# Patient Record
(demographics unavailable — no encounter records)

---

## 2017-01-01 NOTE — PN
Progress Note (short form)





- Note


Progress Note: 


This is 41 wks AGA baby girl born to 29yr mom with schedule repeat c/s, baby 

cried well after birth.


Apgar score 9 and 9.


Mat Hx: Unremarkable


General Appearance: Yes: No Abnormalities


Skin: Yes: No Abnormalities


Head: Yes: No Abnormalities


Eyes: Yes: No Abnormalities


Ears: Yes: No Abnormalities


Nose: Yes: No Abnormalities


Mouth: Yes: No Abnormalities


Chest: Yes: No Abnormalities


Lungs/Respiratory: Yes: No Abnormalities, Clear, Bilateral good air entry


Cardiac: Yes: No Abnormalities (RRR, Nl S1/S2, no murmur)


Abdomen: Yes: No Abnormalities, Umb Ves, 2 artery 1 vein


Gastrointestinal: Yes: No Abnormalities


Genitalia: No Abnormalities


Genitalia, female


Anus: Yes: No Abnormalities, Patent


Extremities: Yes: No Abnormalities


Femoral Pulse: Strong


Ortolani Test: Negative


Thao Test: Negative


Reflexes: Kerrick: Present


Neuro: Yes: No Abnormalities


Cry: Yes: No Abnormalities





Impression: Well Slaughter


Plan


Nutritional support

## 2017-01-01 NOTE — HP
- Maternal History


HBSAG: Negative


Date: 16


RPR: Negative


Date: 16


Group B Strep: Positive


GBS Treated in Labor: No


HIV: Negative





- Maternal Risks


OB Risks:  - failed induction,.  fibroids.  GDM- diet controlled.  

MRSA - treated





[place] Data





- Admission


Date of Admission: 17


Admission Time: 13:50


Date of Delivery: 17


Time of Delivery: 13:37


Wks Gestation by Dates: 40.2


Wks Gestation by Sono: 40.4


Infant Gender: Female


Type of Delivery: Repeat C/S


Reason for C Section: scheduled


Apgar Score @1 Minute: 9


Apgar score @ 5 Minutes: 9


Birth Weight: 3.92 kg


Birth Length: 19.5 in


Head Circumference, Admission: 35.5


Chest Circumference: 35.5


Abdominal Girth: 34





- Vital Signs


  ** Left Upper Arm


Blood Pressure: 72/50


Blood Pressure Mean: 57





  ** Right Upper Arm


Blood Pressure: 78/50


Blood Pressure Mean: 59





  ** Left Calf


Blood Pressure: 72/48


Blood Pressure Mean: 56





  ** Right Calf


Blood Pressure: 84/50


Blood Pressure Mean: 61





- Labs


Labs: 


 Baby's Blood Type, Krystyna











Cord Blood Type  B POSITIVE   17  14:30    


 


BISHNU, Poly Interpret  Negative  (NEGATIVE)   17  14:30    














 Infant, Physical Exam





-  Infant, Admission Exam


Birth Weight: 3.92 kg


Birth Length: 19.5 in


Chest Circumference: 35.5


Initial Vital Signs: 


 Initial Vital Signs











Temp Pulse Resp


 


 98.1 F   142   34 


 


 17 15:03  17 15:03  17 15:03











General Appearance: Yes: No Abnormalities, Pink


Skin: Yes: No Abnormalities


Head: Yes: No Abnormalities, Fontanel flat


Eyes: Yes: No Abnormalities, Clear, Red reflex present (bilaterally)


Ears: Yes: No Abnormalities, Symmetrical.  No: Low set, Periauricular sinus, 

Periauricular skin tag


Nose: Yes: No Abnormalities, Nares patent


Mouth: Yes: No Abnormalities.  No: Cleft lip, Cleft palate


Chest: Yes: No Abnormalities, Symmetrical, Clavicles intact


Lungs/Respiratory: Yes: No Abnormalities, Clear, Bilateral good air entry


Cardiac: Yes: No Abnormalities, S1, S2.  No: Murmur


Abdomen: Yes: No Abnormalities


Gastrointestinal: Yes: No Abnormalities, Active bowel sounds


Genitalia: No Abnormalities


Genitalia, Female: Yes: Labia Normal


Anus: Yes: No Abnormalities, Patent


Extremities: Yes: No Abnormalities, 10 Fingers, 10 Toes


Clavicles: No abnormalities


Femoral Pulse: Strong


Ortolani Test: Negative


Thao Test: Negative


Spine: Yes: No Abnormalities.  No: Sacral tracts, Sacral dimple, Hair tuft


Reflexes: Debby: Present (symmetric), Rooting: Present, Sucking: Present


Neuro: Yes: No Abnormalities, Alert


Cry: Yes: No Abnormalities, Strong





Problem List





- Problems


(1) Single liveborn, born in hospital, delivered by  delivery


Assessment/Plan: 


Ex-39 week LGA (8 lb 10 oz) female born via repeat , APGAR 9, 9, to a 

 mother with a history of GDM, diet controlled, MRSA (), and GBS+, not 

treated, ROM at delivery. MBT Bpos, BBT B pos/Krystyna neg, with remainder of 

maternal labs negative. Exam normal, breastfeeding and formula feeding, doing 

well. Hepatitis B vaccine given. Routine  care, encourage breastfeeding. 


Code(s): Z38.01 - SINGLE LIVEBORN INFANT, DELIVERED BY

## 2017-01-01 NOTE — DS
- Maternal History


Mother's Age: 30YO


 Status: 


HBSAG: Negative


Date: 16


RPR: Negative


Date: 16


Group B Strep: Positive


GBS Treated in Labor: No


HIV: Negative





- Maternal Risks


OB Risks:  - failed induction,.  fibroids.  GDM- diet controlled.  

MRSA - treated





 Data





- Admission


Date of Admission: 17


Admission Time: 13:50


Date of Delivery: 17


Time of Delivery: 13:37


Wks Gestation by Dates: 40.2


Wks Gestation by Sono: 40.4


Infant Gender: Female


Type of Delivery: Repeat C/S


Reason for C Section: scheduled


Apgar Score @1 Minute: 9


Apgar score @ 5 Minutes: 9


Birth Weight: 8 lb 10.274 oz


Birth Length: 19.5 in


Head Circumference, Admission: 35.5


Chest Circumference: 35.5


Abdominal Girth: 34





- Vital Signs


  ** Left Upper Arm


Blood Pressure: 72/50


Blood Pressure Mean: 57





  ** Right Upper Arm


Blood Pressure: 78/50


Blood Pressure Mean: 59





  ** Left Calf


Blood Pressure: 72/48


Blood Pressure Mean: 56





  ** Right Calf


Blood Pressure: 84/50


Blood Pressure Mean: 61





- Hearing Screen


Left Ear: Passed


Right Ear: Passed


Hearing Screen Complete: 04/15/17





- Labs


Labs: 


 Transcutaneous Bilirubin











Transcutaneous Bilirubin       04/15/17





performed                      


 


Transcutaneous Bilirubin       6.2





result                         











 Baby's Blood Type, Krystyna











Cord Blood Type  B POSITIVE   17  14:30    


 


BISHNU, Poly Interpret  Negative  (NEGATIVE)   17  14:30    














- Hepatitis B Vaccine Given


Date: 


Medication





Hepatitis B Vaccine (Engerix-B 10 Mcg/0.5 Ml *Pediatric* -)  10 mcg IM .ONCE ONE


   Stop: 17 18:01











[place] PE, Discharge





- Physical Exam


Last Weight Documented: 8 lb 3.925 oz


Vital Signs: 


 Vital Signs











Temperature  98.2 F   17 07:30


 


Pulse Rate  130   17 09:00


 


Respiratory Rate  34   17 15:03


 


Blood Pressure  72/50   04/15/17 08:19


 


O2 Sat by Pulse Oximetry (%)      








 SpO2





Preductal SpO2, Right Arm        100


Postductal SpO2 [Left Leg]       100








General Appearance: Yes: Well flexed, Full ROM, Spontaneous movements, Pink


Skin: Yes: No Abnormalities, Other (multiple large flat salate grey nevi on back

/buttocks)


Head: Yes: Fontanel flat


Eyes: Yes: Clear


Ears: Yes: Symmetrical.  No: Periauricular sinus, Periauricular skin tag


Nose: Yes: Nares patent


Mouth: Yes: No Abnormalities.  No: Cleft lip, Cleft palate


Chest: Yes: No Abnormalities, Symmetrical, Clavicles intact


Lungs/Respiratory: Yes: Clear, Bilateral good air entry.  No: Sternal 

retractions, Substernal retractions


Cardiac: Yes: S1, S2.  No: Murmur


Abdomen: Yes: Umb Ves, 2 artery 1 vein.  No: Mass palpable


Gastrointestinal: No: Hepatomegaly, Splenomegaly


Genitalia: No Abnormalities


Genitalia, Female: Yes: Labia Normal


Anus: Yes: Patent


Extremities: Yes: 10 Fingers, 10 Toes


Spine: No: Sacral dimple, Hair tuft


Reflexes: Debby: Present (symmetric), Rooting: Present, Sucking: Present


Neuro: Yes: No Abnormalities, Alert, Active


Cry: Yes: Strong


Preductal SpO2, Right Arm: 100


  ** Left Leg


Postductal SpO2: 100





Problem List





- Problems


(1) Single liveborn, born in hospital, delivered by  delivery


Assessment/Plan: 


AGA FEMALE BORN TO 30YO  ,GBS POS MONTHER WITH ROM @ DELIVERY


P: ROUTINE CARE


FEED AD JULISA


Code(s): Z38.01 - SINGLE LIVEBORN INFANT, DELIVERED BY 








Discharge Summary


Reason For Visit: 


Current Active Problems





Single liveborn, born in hospital, delivered by  delivery (Acute) 








Condition: Good





- Instructions


Diet, Activity, Other Instructions: 


Ex-39 week LGA (8 lb 10 oz) female born via repeat , APGAR 9, 9, to a 

 mother with a history of GDM, diet controlled, MRSA (), and GBS+, not 

treated, ROM at delivery. MBT Bpos, BBT B pos/Krystyna neg, with remainder of 

maternal labs negative. Exam normal, breastfeeding and formula feeding, doing 

well. Hepatitis B vaccine given, hearing screen passed bilaterally. Routine 

 care, encourage breastfeeding. Anticipatory guidance reviewed: Safe 

sleeping, never shake baby, umbilical stump care/sponge bathe exclusively for 

now, normal  periodic breathing pattern, normal  stooling pattern

, place baby in sunlight with skin exposed for 15 minutes 2-3 times a day, keep 

away sick contacts and report to ED for any temp of 100.4F or greater. Follow-

up with pediatrician 1-2 days after discharge home. Call  for any questions 

regarding baby. 


Referrals: 


Axel Stiles MD [Staff Physician] - 17


Disposition: HOME

## 2017-01-01 NOTE — PN
[place], Progress Note





- [place] Exam


Weight: 3.71 kg


Chest Circumference: 35.5


Head Circumference: 35.5


Vital Signs: 


 Vital Signs











Temperature  98.4 F   04/15/17 20:25


 


Pulse Rate  142   17 15:03


 


Respiratory Rate  34   17 15:03


 


Blood Pressure  72/50   04/15/17 08:19


 


O2 Sat by Pulse Oximetry (%)      











General Appearance: Yes: No Abnormalities, Pink


Skin: Yes: No Abnormalities, Other (multiple large flat salate grey nevi on back

/buttocks)


Head: Yes: No Abnormalities, Fontanel flat


Eyes: Yes: No Abnormalities, Clear, Red reflex present (bilaterally)


Ears: Yes: No Abnormalities, Symmetrical.  No: Low set, Periauricular sinus, 

Periauricular skin tag


Nose: Yes: No Abnormalities, Nares patent


Mouth: Yes: No Abnormalities.  No: Cleft lip, Cleft palate


Chest: Yes: No Abnormalities, Symmetrical, Clavicles intact


Lungs/Respiratory: Yes: No Abnormalities, Clear, Bilateral good air entry


Cardiac: Yes: No Abnormalities, S1, S2.  No: Murmur


Abdomen: Yes: No Abnormalities


Gastrointestinal: Yes: No Abnormalities, Active bowel sounds


Genitalia: No Abnormalities


Genitalia, Female: Yes: Labia Normal


Anus: Yes: No Abnormalities, Patent


Extremities: Yes: No Abnormalities, 10 Fingers, 10 Toes


Thao Test: Negative


Ortolani Test: Negative


Femoral Pulse: Strong


Spine: Yes: No Abnormalities.  No: Sacral tracts, Sacral dimple, Hair tuft


Reflexes: Debby: Present (symmetric), Rooting: Present, Sucking: Present


Neuro: Yes: No Abnormalities, Alert


Cry: No Abnormalities, Strong





- Other Data/Findings


Labs, Other Data: 


 Intake





Intake, Oral Amount              40


Intake, Oral Amount              40


Intake, Oral Amount              45


Intake, Oral Amount              35


Intake, Oral Amount              60





 Output





Number of Voids                  0


Number of Voids                  0


Number of Voids                  1


Number of Voids                  0


Number of Voids                  1


Stool Size                       Moderate


Stool Size                       Smear


Stool Size                       Large


Stool Size                       Large


[place] Stool Description        Transistional,Soft


 Stool Description        Transistional,Soft


[place] Stool Description        Meconium,Soft


[place] Stool Description        Meconium,Soft





 Transcutaneous Bilirubin











Transcutaneous Bilirubin       04/15/17





performed                      


 


Transcutaneous Bilirubin       6.2





result                         











 Baby's Blood Type, Krystyna











Cord Blood Type  B POSITIVE   17  14:30    


 


BISHNU, Poly Interpret  Negative  (NEGATIVE)   17  14:30    














Problem List





- Problems


(1) Single liveborn, born in hospital, delivered by  delivery


Assessment/Plan: 


FT LGA female born via repeat , APGAR 9, 9, to a  mother with a 

history of GDM, diet controlled, MRSA (), and GBS+, not treated, ROM at 

delivery. MBT Bpos, BBT B pos/Krystyna neg, with remainder of maternal labs 

negative. Exam normal, breastfeeding and formula feeding, doing well, DOL#2. 

Plan: 1. Routine  care, 2. Encourage breastfeeding. 


Code(s): Z38.01 - SINGLE LIVEBORN INFANT, DELIVERED BY